# Patient Record
Sex: FEMALE | Race: ASIAN
[De-identification: names, ages, dates, MRNs, and addresses within clinical notes are randomized per-mention and may not be internally consistent; named-entity substitution may affect disease eponyms.]

---

## 2018-07-11 ENCOUNTER — HOSPITAL ENCOUNTER (INPATIENT)
Dept: HOSPITAL 72 - SDSOVERFLO | Age: 48
LOS: 3 days | Discharge: HOME | DRG: 460 | End: 2018-07-14
Payer: COMMERCIAL

## 2018-07-11 VITALS — SYSTOLIC BLOOD PRESSURE: 108 MMHG | DIASTOLIC BLOOD PRESSURE: 71 MMHG

## 2018-07-11 VITALS — SYSTOLIC BLOOD PRESSURE: 114 MMHG | DIASTOLIC BLOOD PRESSURE: 75 MMHG

## 2018-07-11 VITALS — SYSTOLIC BLOOD PRESSURE: 119 MMHG | DIASTOLIC BLOOD PRESSURE: 74 MMHG

## 2018-07-11 VITALS — DIASTOLIC BLOOD PRESSURE: 74 MMHG | SYSTOLIC BLOOD PRESSURE: 112 MMHG

## 2018-07-11 VITALS — SYSTOLIC BLOOD PRESSURE: 114 MMHG | DIASTOLIC BLOOD PRESSURE: 76 MMHG

## 2018-07-11 VITALS — DIASTOLIC BLOOD PRESSURE: 73 MMHG | SYSTOLIC BLOOD PRESSURE: 124 MMHG

## 2018-07-11 VITALS — DIASTOLIC BLOOD PRESSURE: 74 MMHG | SYSTOLIC BLOOD PRESSURE: 116 MMHG

## 2018-07-11 VITALS — SYSTOLIC BLOOD PRESSURE: 118 MMHG | DIASTOLIC BLOOD PRESSURE: 71 MMHG

## 2018-07-11 VITALS — DIASTOLIC BLOOD PRESSURE: 64 MMHG | SYSTOLIC BLOOD PRESSURE: 101 MMHG

## 2018-07-11 VITALS — SYSTOLIC BLOOD PRESSURE: 115 MMHG | DIASTOLIC BLOOD PRESSURE: 69 MMHG

## 2018-07-11 VITALS — SYSTOLIC BLOOD PRESSURE: 116 MMHG | DIASTOLIC BLOOD PRESSURE: 68 MMHG

## 2018-07-11 VITALS — SYSTOLIC BLOOD PRESSURE: 117 MMHG | DIASTOLIC BLOOD PRESSURE: 75 MMHG

## 2018-07-11 VITALS — HEIGHT: 58.5 IN | WEIGHT: 111.04 LBS | BODY MASS INDEX: 22.69 KG/M2

## 2018-07-11 VITALS — DIASTOLIC BLOOD PRESSURE: 74 MMHG | SYSTOLIC BLOOD PRESSURE: 120 MMHG

## 2018-07-11 DIAGNOSIS — M53.2X7: ICD-10-CM

## 2018-07-11 DIAGNOSIS — K56.7: ICD-10-CM

## 2018-07-11 DIAGNOSIS — M53.2X6: Primary | ICD-10-CM

## 2018-07-11 DIAGNOSIS — M51.36: ICD-10-CM
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
## 2018-07-11 DIAGNOSIS — S39.82XS: ICD-10-CM

## 2018-07-11 DIAGNOSIS — M51.37: ICD-10-CM

## 2018-07-11 DIAGNOSIS — G89.18: ICD-10-CM

## 2018-07-11 PROCEDURE — 86900 BLOOD TYPING SEROLOGIC ABO: CPT

## 2018-07-11 PROCEDURE — 72020 X-RAY EXAM OF SPINE 1 VIEW: CPT

## 2018-07-11 PROCEDURE — 94003 VENT MGMT INPAT SUBQ DAY: CPT

## 2018-07-11 PROCEDURE — 94150 VITAL CAPACITY TEST: CPT

## 2018-07-11 PROCEDURE — 36415 COLL VENOUS BLD VENIPUNCTURE: CPT

## 2018-07-11 PROCEDURE — 87081 CULTURE SCREEN ONLY: CPT

## 2018-07-11 PROCEDURE — 81025 URINE PREGNANCY TEST: CPT

## 2018-07-11 PROCEDURE — 76001: CPT

## 2018-07-11 PROCEDURE — 86850 RBC ANTIBODY SCREEN: CPT

## 2018-07-11 PROCEDURE — 86901 BLOOD TYPING SEROLOGIC RH(D): CPT

## 2018-07-11 RX ADMIN — DEXTROSE AND SODIUM CHLORIDE SCH MLS/HR: 5; .45 INJECTION, SOLUTION INTRAVENOUS at 11:30

## 2018-07-11 RX ADMIN — SODIUM CHLORIDE SCH MLS/HR: 0.9 INJECTION INTRAVENOUS at 22:06

## 2018-07-11 RX ADMIN — DIPHENHYDRAMINE HYDROCHLORIDE PRN MG: 50 INJECTION INTRAMUSCULAR; INTRAVENOUS at 23:22

## 2018-07-11 RX ADMIN — SODIUM CHLORIDE PRN MG: 9 INJECTION, SOLUTION INTRAVENOUS at 13:58

## 2018-07-11 RX ADMIN — DEXTROSE AND SODIUM CHLORIDE SCH MLS/HR: 5; .45 INJECTION, SOLUTION INTRAVENOUS at 15:51

## 2018-07-11 RX ADMIN — DOCUSATE SODIUM SCH MG: 100 CAPSULE, LIQUID FILLED ORAL at 16:41

## 2018-07-11 RX ADMIN — SODIUM CHLORIDE SCH MLS/HR: 0.9 INJECTION INTRAVENOUS at 15:50

## 2018-07-11 RX ADMIN — SODIUM CHLORIDE PRN MG: 9 INJECTION, SOLUTION INTRAVENOUS at 11:24

## 2018-07-11 RX ADMIN — SODIUM CHLORIDE PRN MG: 9 INJECTION, SOLUTION INTRAVENOUS at 11:48

## 2018-07-11 NOTE — DIAGNOSTIC IMAGING REPORT
Indication: Back pain

 

Comparison: None

 

Findings: Fluoroscopic views of the lumbar spine were obtained. 

 

Intraoperative imaging showing localization followed by anterior fusion L4-5 and

L5-S1 with anterior compression plate and screws and discectomy/prosthesis placement

at both of these levels. 5 fluoroscopic images were obtained in total. Fluoroscopic

time 24 seconds.

 

IMPRESSION:

 

Intraoperative imaging

## 2018-07-11 NOTE — OPERATIVE NOTE - DICTATED
DATE OF OPERATION:  07/11/2018



VASCULAR SURGEON:  Mele Wallace M.D.



SPINE SURGEON:  Panfilo Resendiz M.D.



PREOPERATIVE DIAGNOSIS:  Degenerative disc disease.



POSTOPERATIVE DIAGNOSIS:  Degenerative disc disease.



PROCEDURE PERFORMED:

1. Anterior retroperitoneal exposure, L4-L5 vertebral interspace.

2. Anterior retroperitoneal exposure, L5-S1 vertebral interspace.



INDICATIONS:  The patient is a very pleasant woman who was in my office

prior to surgery.  She has a prior history of a Gyn surgery with a

low-transverse incision, but no history of deep venous thrombosis,

bleeding complications, or anterior spine surgery.  She is scheduled to

undergo lumbar spine surgery at L4-5 and L5-S1.  I discussed with her the

need for a vertical midline incision to gain safe and adequate access for

L4-L5.  She understands a separate incision be required.  We also

discussed risks of vascular injury, possible need for blood transfusion,

deep venous thrombosis.



DESCRIPTION OF FINDINGS:  A low vertical midline incision was used.  Left

retroperitoneal approach was used.  There was no peritoneal or ureteral

violation.  There was no vascular injury.  Blood loss was less than 50 mL.

Fluoroscopy was used to confirm both levels prior to instrumentation.

L5-S1 was obtained below the iliac bifurcation and L4-L5 was obtained

above the iliac bifurcation with retraction of left iliac vessels towards

the patient's right.



DESCRIPTION OF PROCEDURE:  The patient was taken to the operating room.

General anesthesia was used.  IV antibiotics were given.  Garcia catheter

was placed.  The patient's abdomen was prepped and draped.  Appropriate

time-out procedures were taken.  A low vertical midline incision was made.

Anterior fascia was incised longitudinally in the midline.  A plane was

identified posterior to the left rectus abdominis developed

posterolaterally towards the patient's left.  Retroperitoneal space was

entered below the arcuate line.  The peritoneum and ureter were mobilized

towards the patient's right exposing the left common iliac artery and

vein.  Initially, dissection was carried on the undersurface of the left

common iliac vein.  The middle sacral artery and vein were ligated using

bipolar electrocautery and vascular clips.  This allowed us to retract the

left common iliac artery and vein superiorly and laterally and expose the

anterior surface of the L5-S1.  The Omni retractor was set in place.

Fluoroscopy was used to confirm the appropriate level.  Then,

instrumentation was performed at L5-S1 and dictated separately.  The

retractor was then repositioned above the iliac bifurcation.  Dissection

was carried lateral to the left common iliac artery and vein.  The

iliolumbar vein was identified.  It was triply ligated proximally and

distally with vascular clips and divided.  The L4 segmental artery and

vein were also identified and ligated with vascular clips and divided, and

this allowed us to retract the left common iliac artery and vein towards

the patient's right and expose the anterior surface of L4-L5.  Once again,

the Omni retractor was set in place.  Fluoroscopy was used to confirm the

appropriate level and instrumentation was performed at L4-L5 and dictated

separately.  The retractors were then gently removed.  The peritoneum and

ureter intact.  Iliac vessels were intact.  Anterior fascia was then

closed using #1 PDS in a running fashion.  The skin and subcutaneous

tissue were closed with 3-0 Vicryl and 4-0 Monocryl in running

subcuticular closure technique.



ESTIMATED BLOOD LOSS:  Less than 50 mL.



COMPLICATIONS:  None.









  ______________________________________________

  Mele Wallace M.D.





DR:  ENRIQUE

D:  07/11/2018 11:48

T:  07/11/2018 17:17

JOB#:  [id number]

CC:  Panfilo Resendiz M.D.

## 2018-07-11 NOTE — BRIEF OPERATIVE NOTE
Immediate Post Operative Note


Operative Note


Pre-op Diagnosis:


Traumatic Back Pain Lumbar Spine Instability


Procedure:


ALIF L4-L5, L5-S1


BMP


Fluro


Internal fixation


anterior plate L4-L5, L5-S1


Post-op Diagnosis:  same as pre-op


Findings:  consistent w/pre-op dx studies


Surgeon:  Martín Maxwell MD


Additional Surgeons:  Rodrigo BOB


Anesthesiologist:  Lorie BOB


Anesthesia:  general


Specimen:  yes


Complications:  none


Condition:  stable


Fluids:  anesthesia


Estimated Blood Loss:  minimal


Drains:  none


Implant(s) used?:  Yes











JOSH GUEVARA Jul 11, 2018 10:13

## 2018-07-11 NOTE — PRE-PROCEDURE NOTE/ATTESTATION
Pre-Procedure Note/Attestation


Complete Prior to Procedure


Planned Procedure:  not applicable


Procedure Narrative:


ALIF L4-L5, L5-S1


possible posterior pedicle screw instrumentation L4-L5-S1





Indications for Procedure


Pre-Operative Diagnosis:


Traumatic Back Pain Lumbar Spine Instability





Attestation


I attest that I discussed the nature of the procedure; its benefits; risks and 

complications; and alternatives (and the risks and benefits of such alternatives

), prior to the procedure, with the patient (or the patient's legal 

representative).





I attest that, if there was a reasonable possibility of needing a blood 

transfusion, the patient (or the patient's legal representative) was given the 

[place] Department [employer] standardized written summary, pursuant 

to the Jignesh Adela Blood Safety Act ([place] Health and Safety Code # 1645, as 

amended).





I attest that I re-evaluated the patient just prior to the surgery and that 

there has been no change in the patient's H&P, except as documented below:











JOSH GUEVARA Jul 11, 2018 07:30
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
## 2018-07-11 NOTE — OPERATIVE NOTE - DICTATED
DATE OF OPERATION:  07/11/2018



PRIMARY SURGEON:  Panfilo Resenidz, Ph.D., M.D.



CO-SURGEON:  Mele Wallace M.D., Anterior Approach Surgeon, Vascular Surgery

with Dr. Resendiz as assist with Dr. Wallace exposure.  Please see separate

report for anterior exposure and closure.



PREOPERATIVE DIAGNOSIS:  Posttraumatic lumbar spine L4-L5, instability,

severe back pain.  Deformity L5-S1.



POSTOPERATIVE DIAGNOSIS:  Posttraumatic lumbar spine L4-L5, instability,

severe back pain.  Deformity L5-S1.



PROCEDURES:

1. Anterior interbody reconstruction and fusion with synthetic titanium

AERO-L device at L4-L5 and L5-S1.

2. Correction deformity L4-L5 and L5-S1.

3. Internal fixation with interbody device at L4-L5 and L5-S1.

4. Anterior internal plate fixation with bilateral cephalad/caudad

screws, plate, separate L4-L5 and L5-S1 compressive mode.

5. Placement of bone-morphogenic protein L4-L5 and L5-S1 for fusion.

6. Fluoroscopic guidance intraoperative interpreted by surgeon.



ESTIMATED BLOOD LOSS:  Less than 50 mL.



COMPLICATIONS:  None.



POSTOP CONDITION:  Good/stable.



DRAINS:  None.



SPECIMEN:  Disc fragments to pathology.



PROCEDURE IN DETAIL:  The patient was brought to the operating room and in

the supine position, general anesthesia with intubation was induced.

Intravenous antibiotics and intravenous Decadron were administered 30

minutes prior to incision time.



The anterior abdomen was sterilely prepped and draped free in usual

sterile fashion.  A longitudinal midline incision was sharply placed in

the dermis and epidermis.  Please see separate report of Dr. Wallace.



Identification of the L5-S1 interval was undertaken with fluoroscopic

guidance under sterile conditions with a spinal needle placed midline.  AP

lateral radiographs obtained interpreted by surgeons demonstrating correct

level and midline.  Midline marked appropriately.  Needle removed.

Annulotomy.  Discectomy to, but not through the posterior longitudinal

ligament.  Denuding of the inferior L5 and superior S1 vertebral body

endplates to bleeding subchondral bone.  Subchondral bone integrity

maintained.  Severe collapse.  Anterior osteophytes resected under

high-powered magnification.  The interbody device with appropriate

dimensions with correction of lordosis containing bone morphogenic protein

selected and tamped into position after trial utilization with

fluoroscopic guidance for determination of the correct prosthetic device,

size, depth, height, and lordotic angulation.  Device compressive internal

fixation pins tamped into position.  Excellent position and bone density.

Fluoroscopic guidance.  The graft was incorporated in fibrin glue.

Anterior internal plate fixation in a compressive mode with 2 screws

cephalad into the L5 and 2 screws caudad into the first sacral segment

with fluoroscopic guidance.  Locked into position.



Attention turned to the L4-L5 interval.  Identification of midline and

correct level undertaken with fluoroscopic guidance under sterile

conditions.  Annulotomy performed.  Diskectomy to, but not through the

posterior longitudinal ligament.  Denuding of the endplates to the

bleeding subchondral bone without penetration of the subchondral bone.

Appropriate lordotic implant trial utilized for determination of

prosthetic device size.  Sterile prosthetic titanium device packed with

bone morphogenic protein, tamped into excellent position, internal flange

fixation, followed with implant incorporation of fibrin glue.  Anterior

compressive plate fixation with 2 screws into the L4 and anterior screws

into the L5 vertebral body.  The screws were locked at both the L5-S1 and

the L4-L5 positions.  AP lateral radiographs obtained and recorded

demonstrating excellent alignment.  Wound irrigated with

antibiotic-containing saline.  Please see closure, Dr. Wallace.









  ______________________________________________

  Panfilo Resendiz M.D.





DR:  TANJA

D:  07/11/2018 10:24

T:  07/11/2018 16:47

JOB#:  [id number]

CC:

## 2018-07-11 NOTE — ANETHESIA PREOPERATIVE EVAL
Anesthesia Pre-op PMH/ROS


General


Date of Evaluation:  2018


Time of Evaluation:  07:21


Anesthesiologist:  Lorie


ASA Score:  ASA 2


Mallampati Score


Class I : Soft palate, uvula, fauces, pillars visible


Class II: Soft palate, uvula, fauces visible


Class III: Soft palate, base of uvula visible


Class IV: Only hard plate visible


Mallampati Classification:  Class I


Surgeon:  Martín


Diagnosis:  Back Pain


Surgical Procedure:  ALIF L4-5, L5-S1


Anesthesia History:  none


Family History:  no anesthesia problems


Allergies:  


Coded Allergies:  


     No Known Allergies (Unverified , 7/10/18)


Medications:  see eMAR





Past Medical History


Gastrointestinal/Genitourinary:  Reports: other - Ovarian Cysts


PSxH Narrative:


R Oophorectomy, L Cystectomy





Anesthesia Pre-op Phys. Exam


Physician Exam





Last Vital Signs








  Date Time  Temp Pulse Resp B/P (MAP) Pulse Ox O2 Delivery O2 Flow Rate FiO2


 


18 06:05 97.2 66 20 101/64 (76) 99   





 97.2       


 


18 06:00      Room Air  








Constitutional:  NAD


Neurologic:  CN 2-12 intact


Cardiovascular:  RRR


Respiratory:  CTA


Gastrointestinal:  S/NT/ND





Airway Exam


Mallampati Score:  Class II


MO:  full


ROM:  full


Teeth:  intact





Anesthesia Pre-op A/P


Labs


Urine Pregnancy Test











Test


  18


05:35


 


Urine HCG, Qualitative


  Negative


(NEGATIVE)











Risk Assessment & Plan


Assessment:


ASA 2


Plan:


GA, BIS, GlideScope Go


Status Change Before Surgery:  No





Pre-Antibiotics


Dru Grams Ancef IV


Given Within 1 Hr of Incision:  Yes


Time Given:  07:41











Hayden Melo MD 2018 06:30

## 2018-07-11 NOTE — IMMEDIATE POST-OP EVALUATION
Immediate Post-Op Evalulation


Immediate Post-Op Evalulation


Procedure:  ALIF L4-5, L5-S1


Date of Evaluation:  2018


Time of Evaluation:  10:58


IV Fluids:  900 LR


Blood Products:  0


Estimated Blood Loss:  30


Urinary Output:  200


Blood Pressure Systolic:  119


Blood Pressure Diastolic:  74


Pulse Rate:  79


Respiratory Rate:  16


O2 Sat by Pulse Oximetry:  100


Temperature (Fahrenheit):  98.4


Pain Score (1-10):  3


Nausea:  No


Vomiting:  No


Complications


0


Patient Status:  awake, reacts, patent, extubated, none


Hydration Status:  adequate


Dru Grams Ancef IV


Given Within 1 Hr of Incision:  Yes


Time Given:  07:41











Hayden Melo MD 2018 07:15

## 2018-07-12 VITALS — DIASTOLIC BLOOD PRESSURE: 65 MMHG | SYSTOLIC BLOOD PRESSURE: 101 MMHG

## 2018-07-12 VITALS — SYSTOLIC BLOOD PRESSURE: 94 MMHG | DIASTOLIC BLOOD PRESSURE: 58 MMHG

## 2018-07-12 VITALS — DIASTOLIC BLOOD PRESSURE: 63 MMHG | SYSTOLIC BLOOD PRESSURE: 93 MMHG

## 2018-07-12 VITALS — DIASTOLIC BLOOD PRESSURE: 52 MMHG | SYSTOLIC BLOOD PRESSURE: 92 MMHG

## 2018-07-12 VITALS — SYSTOLIC BLOOD PRESSURE: 95 MMHG | DIASTOLIC BLOOD PRESSURE: 66 MMHG

## 2018-07-12 VITALS — SYSTOLIC BLOOD PRESSURE: 91 MMHG | DIASTOLIC BLOOD PRESSURE: 55 MMHG

## 2018-07-12 VITALS — SYSTOLIC BLOOD PRESSURE: 97 MMHG | DIASTOLIC BLOOD PRESSURE: 60 MMHG

## 2018-07-12 RX ADMIN — DEXTROSE AND SODIUM CHLORIDE SCH MLS/HR: 5; .45 INJECTION, SOLUTION INTRAVENOUS at 11:53

## 2018-07-12 RX ADMIN — SODIUM CHLORIDE SCH MLS/HR: 0.9 INJECTION INTRAVENOUS at 06:05

## 2018-07-12 RX ADMIN — DEXTROSE AND SODIUM CHLORIDE SCH MLS/HR: 5; .45 INJECTION, SOLUTION INTRAVENOUS at 19:30

## 2018-07-12 RX ADMIN — DOCUSATE SODIUM SCH MG: 100 CAPSULE, LIQUID FILLED ORAL at 14:08

## 2018-07-12 RX ADMIN — HYDROCODONE BITARTRATE AND ACETAMINOPHEN PRN TAB: 10; 325 TABLET ORAL at 14:09

## 2018-07-12 RX ADMIN — DIPHENHYDRAMINE HYDROCHLORIDE PRN MG: 50 INJECTION INTRAMUSCULAR; INTRAVENOUS at 04:36

## 2018-07-12 RX ADMIN — HYDROCODONE BITARTRATE AND ACETAMINOPHEN PRN TAB: 10; 325 TABLET ORAL at 19:11

## 2018-07-12 RX ADMIN — DOCUSATE SODIUM SCH MG: 100 CAPSULE, LIQUID FILLED ORAL at 18:00

## 2018-07-12 RX ADMIN — DIPHENHYDRAMINE HYDROCHLORIDE PRN MG: 50 INJECTION INTRAMUSCULAR; INTRAVENOUS at 08:10

## 2018-07-12 RX ADMIN — DEXTROSE AND SODIUM CHLORIDE SCH MLS/HR: 5; .45 INJECTION, SOLUTION INTRAVENOUS at 02:34

## 2018-07-12 RX ADMIN — DOCUSATE SODIUM SCH MG: 100 CAPSULE, LIQUID FILLED ORAL at 08:16

## 2018-07-12 NOTE — ORTHOPEDIC SPINE PROGRESS NOTE
Ortho Spine - Progress Note


Subjective


Additional Comments:


initiating walking. Resolution preop back pain. No lower extremity 

radiculopathy or subjective weakness





Objective


Vital Signs:





Last 24 Hour Vital Signs








  Date Time  Temp Pulse Resp B/P (MAP) Pulse Ox O2 Delivery O2 Flow Rate FiO2


 


7/12/18 08:26 97.7 73 18 101/65 (77) 99   





 97.7       


 


7/12/18 06:52 206.2  16  97   


 


7/12/18 06:51  71      


 


7/12/18 06:14    95/66 (76)    


 


7/12/18 05:00 96.8 71 19 91/55 (67) 97   





 96.8       


 


7/12/18 00:00 97.5 76 18 92/52 (65) 97   





 97.5       


 


7/11/18 21:00 97.7 65 19 108/71 (83) 100   





 97.7       


 


7/11/18 21:00      Nasal Cannula 2.0 


 


7/11/18 16:00 97.8 86  124/73 (90) 98   





 97.8       


 


7/11/18 12:15 98.4 76  116/74 (88) 98   





 98.4       


 


7/11/18 12:00 98.5 71 14 112/74 100 Nasal Cannula 3 





 98.5       


 


7/11/18 11:54 98.5       


 


7/11/18 11:48 98.3       


 


7/11/18 11:48  69 17 114/76 100 Nasal Cannula 3 


 


7/11/18 11:35  72 22 114/75 100 Nasal Cannula 3 


 


7/11/18 11:24 98.3       


 


7/11/18 11:24  71 20 117/75 100 Nasal Cannula 3 


 


7/11/18 11:15  74 13 120/74 100 Simple Mask 6 


 


7/11/18 11:05  75 15 118/71 100 Simple Mask 6 


 


7/11/18 10:57  77 17 115/69 100 Simple Mask 6 


 


7/11/18 10:52  78 22 116/68 100 Simple Mask 6 


 


7/11/18 10:50 209.1 79 16  100   


 


7/11/18 10:47 98.4 87 16 119/74 100 Simple Mask 6 





 98.4       








I&O:











Intake and Output  


 


 7/11/18 7/12/18





 19:00 07:00


 


Intake Total 1555 ml 1085.84 ml


 


Output Total 830 ml 600 ml


 


Balance 725 ml 485.84 ml


 


  


 


Intake IV Total 1555 ml 1085.84 ml


 


Output Urine Total 800 ml 600 ml


 


Estimated Blood Loss 30 ml 











Assessment


Procedure Performed:


ALIF L4-L5, L5-S1


BMP


Fluro


Internal fixation


anterior plate L4-L5, L5-S1





Plan


Additional Comments:


increase gate


ice chips only until bowel sounds / flatua / bm











JOSH GUEVARA Jul 12, 2018 09:48

## 2018-07-12 NOTE — 48 HOUR POST ANESTHESIA EVAL
Post Anesthesia Evaluation


Procedure:  ALIF L4-5, L5-S1


Date of Evaluation:  Jul 12, 2018


Time of Evaluation:  06:50


Blood Pressure Systolic:  95


0:  61


Pulse Rate:  71


Respiratory Rate:  16


Temperature (Fahrenheit):  96.8


O2 Sat by Pulse Oximetry:  97


Airway:  patent


Nausea:  No


Vomiting:  No


Pain Intensity:  2


Hydration Status:  adequate


Cardiopulmonary Status:


Stable


Mental Status/LOC:  patient returned to baseline


Follow-up Care/Observations:


0


Post-Anesthesia Complications:


0


Follow-up care needed:  N/A











Hayden Melo MD Jul 12, 2018 06:51

## 2018-07-12 NOTE — PROGRESS NOTE
DATE:  07/12/2018



ACUTE PAIN MANAGEMENT PHYSICIAN PROGRESS NOTE



MEDICATIONS:  Medication administration record reviewed.  Medications

include Phenergan, Chloraseptic, Protonix, Zofran, Narcan, Dilaudid,

Norco, Colace, Benadryl, Catapres, Soma, and Mylanta.



LABORATORY STUDIES:  No interval laboratory studies.



OBJECTIVE:

VITAL SIGNS:  Pain level 6/10 on the visual analog pain scale.  Oxygen

saturation 97%, blood pressure 95/66, respirations 19, pulse 71, and

afebrile.



I saw the patient at the bedside with the nurse, RN, Maria.  I spent

over 60 minutes in consultation today.  The patient has been progressing

well after her anterior lumbar spine surgery yesterday.  She remains NPO

as she is not passing any flatus.  We do have her on IV fluids at 125 mL

an hour to help with intravascular rehydration.  The patient denies any

headaches or nausea symptoms.  The patient has been responding quite well

to the subcutaneous Dilaudid injections.  There has been no over-sedation

_____ using the current dosing as listed.  The patient did receive Soma

last night, which did help with sleep.  The patient is in good spirits.

She appears neurologically intact.  The patient is excited to begin

ambulating with physical therapy later this morning.  The patient has been

compliant using her incentive spirometer.



Overall, the patient is on schedule with her recovery.  We will see how

she progresses with physical therapy ambulation later today, and we still

await restoration of bowel function after ALIF procedure and _____ the

patient to then advance her diet.









  ______________________________________________

  Doug Reyna M.D.





DR:  RICK

D:  07/12/2018 06:28

T:  07/12/2018 16:45

JOB#:  [id number]

CC:

## 2018-07-13 VITALS — DIASTOLIC BLOOD PRESSURE: 61 MMHG | SYSTOLIC BLOOD PRESSURE: 59 MMHG

## 2018-07-13 VITALS — SYSTOLIC BLOOD PRESSURE: 100 MMHG | DIASTOLIC BLOOD PRESSURE: 69 MMHG

## 2018-07-13 VITALS — SYSTOLIC BLOOD PRESSURE: 97 MMHG | DIASTOLIC BLOOD PRESSURE: 61 MMHG

## 2018-07-13 VITALS — DIASTOLIC BLOOD PRESSURE: 77 MMHG | SYSTOLIC BLOOD PRESSURE: 104 MMHG

## 2018-07-13 VITALS — SYSTOLIC BLOOD PRESSURE: 100 MMHG | DIASTOLIC BLOOD PRESSURE: 72 MMHG

## 2018-07-13 VITALS — DIASTOLIC BLOOD PRESSURE: 72 MMHG | SYSTOLIC BLOOD PRESSURE: 99 MMHG

## 2018-07-13 RX ADMIN — HYDROCODONE BITARTRATE AND ACETAMINOPHEN PRN TAB: 10; 325 TABLET ORAL at 14:00

## 2018-07-13 RX ADMIN — DEXTROSE AND SODIUM CHLORIDE SCH MLS/HR: 5; .45 INJECTION, SOLUTION INTRAVENOUS at 01:00

## 2018-07-13 RX ADMIN — HYDROCODONE BITARTRATE AND ACETAMINOPHEN PRN TAB: 10; 325 TABLET ORAL at 05:46

## 2018-07-13 RX ADMIN — DEXTROSE AND SODIUM CHLORIDE SCH MLS/HR: 5; .45 INJECTION, SOLUTION INTRAVENOUS at 21:16

## 2018-07-13 RX ADMIN — DOCUSATE SODIUM SCH MG: 100 CAPSULE, LIQUID FILLED ORAL at 17:36

## 2018-07-13 RX ADMIN — DOCUSATE SODIUM SCH MG: 100 CAPSULE, LIQUID FILLED ORAL at 09:13

## 2018-07-13 RX ADMIN — DEXTROSE AND SODIUM CHLORIDE SCH MLS/HR: 5; .45 INJECTION, SOLUTION INTRAVENOUS at 11:30

## 2018-07-13 RX ADMIN — HYDROCODONE BITARTRATE AND ACETAMINOPHEN PRN TAB: 10; 325 TABLET ORAL at 09:26

## 2018-07-13 RX ADMIN — HYDROCODONE BITARTRATE AND ACETAMINOPHEN PRN TAB: 10; 325 TABLET ORAL at 20:32

## 2018-07-13 NOTE — CARDIOLOGY PROGRESS NOTE
Assessment/Plan


Assessment/Plan


[id number]


home in am with bm 


ambulate





Objective





Last 24 Hour Vital Signs








  Date Time  Temp Pulse Resp B/P (MAP) Pulse Ox O2 Delivery O2 Flow Rate FiO2


 


7/13/18 18:23  92 18  97   


 


7/13/18 12:00 98.3 93 20 100/72 (81) 96   





 98.3       


 


7/13/18 11:17      Room Air  


 


7/13/18 11:13 98.8 98 20 99/72 (81) 97   





 98.8       


 


7/13/18 04:00 100.3 100 18 59/61 (60) 97   





 100.3       


 


7/13/18 00:00 100.0 90 18 97/61 (73) 96   





 100.0       


 


7/12/18 21:00      Room Air  


 


7/12/18 20:00 97.3 82 18 97/60 (72) 96   





 97.3       

















Intake and Output  


 


 7/12/18 7/13/18





 19:00 07:00


 


Intake Total 1250 ml 1000 ml


 


Output Total 800 ml 


 


Balance 450 ml 1000 ml


 


  


 


IV Total 1250 ml 1000 ml


 


Output Urine Total 800 ml 


 


# Voids 1 2











Microbiology








 Date/Time


Source Procedure


Growth Status


 


 


 7/11/18 05:50


Nasal Nares MRSA Culture - Final


NO METHICILLIN RESISTANT STAPH AUREUS... Complete

















Tim Bone MD Jul 13, 2018 19:03

## 2018-07-13 NOTE — PROGRESS NOTE
DATE:  07/13/2018



ACUTE PAIN MANAGEMENT PHYSICIAN PROGRESS NOTE



VITAL SIGNS:  T-max 100.3, pulse 100, respirations 18, blood pressure

97/61, oxygen saturation 97% on room air.



LABORATORY STUDIES:  No interval laboratory studies.



MEDICATIONS:  Medication administration record reviewed.  Medications

include IV fluids, Colace, Protonix.  P.r.n. medications include Narcan,

Benadryl, Catapres, Soma, Norco, Zofran, Phenergan, Chloraseptic spray,

Mylanta, Dilaudid.



I spent over 60 minutes in consultation today.  I saw the patient at the

bedside with the nurse, TULIO Morrow.  I discussed the case with the

charge nurse, TULIO Johnson.  I discussed the case with the surgeon, Dr. Resendiz, and the physical therapist.  The patient has been doing very

well, recovering from her extensive surgery on her lumbar spine.  She is

able to move in and out of bed with relative ease as long as she stays on

a good schedule using her p.r.n. doses of pain medications, which have

been primarily IV Dilaudid and oral Norco.  The patient also has been

using Soma at night regularly, which has been aiding her sleep patterns

quite nicely.  Itching symptoms have resolved.  She has no nausea

symptoms.  She does not have much of an appetite, but denies any nausea

symptoms.  We will continue her on IV fluids at a rate of 125 mL an hour

currently.  She still has not yet passed positive flatus.  It is now

nearly 48 hours after her surgery.  We would expect improvement in bowel

function over the next 12 hours.  When she does pass flatus, the surgeon,

Dr. Resendiz, will determine how to best advance her diet.



The patient shows no anxiety symptoms.  She has good social support with

her  and children who visit frequently.  The patient already has a

good supply of Norco and Soma for home usage.  The patient has not been

extremely compliant using her incentive spirometer.  Her recent low-grade

fevers are then not surprising.  I did encourage much more aggressive

incentive spirometer usage to avoid postoperative atelectasis and avoid

fevers.









  ______________________________________________

  Doug Reyna M.D.





DR:  Nae

D:  07/13/2018 09:22

T:  07/13/2018 19:10

JOB#:  [id number]

CC:

## 2018-07-14 VITALS — DIASTOLIC BLOOD PRESSURE: 68 MMHG | SYSTOLIC BLOOD PRESSURE: 105 MMHG

## 2018-07-14 VITALS — SYSTOLIC BLOOD PRESSURE: 110 MMHG | DIASTOLIC BLOOD PRESSURE: 71 MMHG

## 2018-07-14 VITALS — DIASTOLIC BLOOD PRESSURE: 69 MMHG | SYSTOLIC BLOOD PRESSURE: 107 MMHG

## 2018-07-14 VITALS — SYSTOLIC BLOOD PRESSURE: 105 MMHG | DIASTOLIC BLOOD PRESSURE: 59 MMHG

## 2018-07-14 RX ADMIN — DEXTROSE AND SODIUM CHLORIDE SCH MLS/HR: 5; .45 INJECTION, SOLUTION INTRAVENOUS at 03:53

## 2018-07-14 RX ADMIN — HYDROCODONE BITARTRATE AND ACETAMINOPHEN PRN TAB: 10; 325 TABLET ORAL at 09:19

## 2018-07-14 RX ADMIN — DOCUSATE SODIUM SCH MG: 100 CAPSULE, LIQUID FILLED ORAL at 09:20

## 2018-07-14 RX ADMIN — HYDROCODONE BITARTRATE AND ACETAMINOPHEN PRN TAB: 10; 325 TABLET ORAL at 03:53

## 2018-07-14 NOTE — PROGRESS NOTE
DATE:  07/14/2018



ACUTE PAIN MANAGEMENT PHYSICIAN PROGRESS NOTE.



MEDICATIONS:  Medication administration record reviewed.  Medications

include IV fluids, Colace, Protonix, p.r.n. medications include Narcan,

Benadryl, Catapres, Soma, Norco, Zofran, Phenergan, Dilaudid, Chloraseptic

spray, Mylanta, and Dilaudid.



LABORATORY STUDIES:  No interval laboratory studies.



OBJECTIVE:

VITAL SIGNS:  Within normal limits.  Afebrile, pulse 84, respirations 18,

blood pressure 105/59, oxygen saturation 96% on room air.



I spent over 60 minutes in consultation today.  I saw the patient at the

bedside with her daughter.  I discussed the case with the hospital

pharmacist along with the surgeon, Dr. Panfilo Resendiz, and the nurse RN,

Kaitlin.  After dosing the patient with magnesium citrate 100 mL per the

surgeon, the patient did have a bowel movement after her diet was

advanced.  She did began passing flatus earlier yesterday morning.  She

tolerated clear liquids and continued to tolerate increasing diet.  She

has no nausea symptoms.  The patient continues to ambulate well.  She is

breathing comfortably on room air and is much more compliant using the

incentive spirometer.  She has been using the oral Norco quite regularly.

I did leave a prescription for next for ______ tablets of Norco for

outpatient usage.  The patient already has a small supply of Norco as well

as prescription filled for Soma for outpatient usage.  The patient has

excellent social support with her family and children and the plan is for

her to transport to home later this morning through a transportation

company arranged preoperatively.  The patient will continue using her

incentive spirometer at home.  The patient will follow up with Dr. Resendiz

in the outpatient followup clinic.  With normal vital signs and after

having passed a bowel movement to demonstrate restoration of proper bowel

function, I see no no contraindications for discharge to home at this

time.









  ______________________________________________

  Doug Reyna M.D.





DR:  Jonny

D:  07/14/2018 05:42

T:  07/14/2018 16:23

JOB#:  [id number]

CC:
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
## 2018-07-14 NOTE — DISCHARGE INSTRUCTIONS
Discharge Instructions


Discharge Instructions


Special Instructions


fu with dr flowers as instructed





For Congestive Heart Failure


Reminder


Report to your physician any weight gain of 5 pounds or more in one week.











Tim Bone MD Jul 14, 2018 10:40

## 2018-07-14 NOTE — DISCHARGE SUMMARY
Discharge Summary


Hospital Course


Date of Admission


Jul 11, 2018 at 05:14


Date of Discharge





Admitting Diagnosis





MAURY Barker is a 48 year old female who was admitted on Jul 11, 2018 at 05:14 

for Lumbar Discogenic Back Pain


Hospital Course


[id number]





Discharge


Discharge Disposition


Patient was discharged to











Tim Bone MD Jul 14, 2018 10:39

## 2018-07-15 NOTE — DISCHARGE SUMMARY
DATE OF ADMISSION:  07/11/2018



DATE OF DISCHARGE:  07/14/2018



DISCHARGE DIAGNOSES:  Posttraumatic lumbar spine L4-L5 instability, severe

back pain, and deformity of L5-S1.



PROCEDURES PERFORMED:  Anterior interbody reconstruction and fusion with

synthetic titanium AERO-L device at L4-L5 and L5-S1 and correction of

deformity at those levels.



HOSPITAL COURSE:  This patient was admitted to the hospital after surgery

by Dr. Resendiz and Dr. Wallace.  She did relatively well.  She has had bowel

movement the day prior to discharge and has been walking around the halls.

No significant issues and on the day of discharge, she is afebrile, her

blood pressure is 107/69 with heart rate of 85, temperature 98 degrees,

and saturation of 97% on room air.  Lungs are clear to auscultation and

percussion.  Cardiac examination, S1 is normal, S2 is normal, regular rate

and rhythm.  Abdomen is soft.  Dressing in anterior lower abdomen is

clean, dry, and intact.  Extremities, there is no clubbing, cyanosis, or

edema.  She is able to move all of her toes.  She does have complaint of

some tingling sensation in her first toe only, not in the remainder of the

foot or any other toes.  The patient's case was discussed with Dr. Resendiz

and decision was made for the patient to be discharged home with followup

as instructed by Dr. Resendiz as an outpatient.









  ______________________________________________

  Tim Bone M.D.





DR:  SPEEDY

D:  07/14/2018 10:39

T:  07/15/2018 04:06

JOB#:  [id number]

CC: